# Patient Record
Sex: MALE | Race: WHITE | NOT HISPANIC OR LATINO | Employment: FULL TIME | URBAN - METROPOLITAN AREA
[De-identification: names, ages, dates, MRNs, and addresses within clinical notes are randomized per-mention and may not be internally consistent; named-entity substitution may affect disease eponyms.]

---

## 2024-01-24 ENCOUNTER — OFFICE VISIT (OUTPATIENT)
Dept: FAMILY MEDICINE | Facility: CLINIC | Age: 24
End: 2024-01-24
Payer: COMMERCIAL

## 2024-01-24 VITALS
RESPIRATION RATE: 16 BRPM | HEIGHT: 69 IN | SYSTOLIC BLOOD PRESSURE: 148 MMHG | OXYGEN SATURATION: 98 % | DIASTOLIC BLOOD PRESSURE: 88 MMHG | HEART RATE: 88 BPM | BODY MASS INDEX: 27.27 KG/M2 | WEIGHT: 184.1 LBS | TEMPERATURE: 97.8 F

## 2024-01-24 DIAGNOSIS — J30.81 ALLERGY TO DOGS: ICD-10-CM

## 2024-01-24 DIAGNOSIS — K14.8 TONGUE LESION: ICD-10-CM

## 2024-01-24 DIAGNOSIS — L98.9 SKIN LESION: ICD-10-CM

## 2024-01-24 DIAGNOSIS — Z13.1 SCREENING FOR DIABETES MELLITUS: ICD-10-CM

## 2024-01-24 DIAGNOSIS — Z13.220 SCREENING CHOLESTEROL LEVEL: ICD-10-CM

## 2024-01-24 DIAGNOSIS — R03.0 ELEVATED BLOOD PRESSURE READING WITHOUT DIAGNOSIS OF HYPERTENSION: ICD-10-CM

## 2024-01-24 DIAGNOSIS — Z00.00 ROUTINE GENERAL MEDICAL EXAMINATION AT A HEALTH CARE FACILITY: Primary | ICD-10-CM

## 2024-01-24 PROCEDURE — 99385 PREV VISIT NEW AGE 18-39: CPT | Performed by: FAMILY MEDICINE

## 2024-01-24 ASSESSMENT — ENCOUNTER SYMPTOMS
JOINT SWELLING: 0
EYE PAIN: 0
CONSTIPATION: 0
ABDOMINAL PAIN: 0
DIZZINESS: 0
NERVOUS/ANXIOUS: 0
HEADACHES: 0
CHILLS: 0
FEVER: 0
SHORTNESS OF BREATH: 0
PALPITATIONS: 0
SORE THROAT: 0
DYSURIA: 0
NAUSEA: 0
HEMATOCHEZIA: 0
MYALGIAS: 0
WEAKNESS: 0
PARESTHESIAS: 0
HEMATURIA: 0
DIARRHEA: 0
COUGH: 0
HEARTBURN: 0
FREQUENCY: 0
ARTHRALGIAS: 0

## 2024-01-24 ASSESSMENT — PAIN SCALES - GENERAL: PAINLEVEL: NO PAIN (0)

## 2024-01-24 NOTE — PROGRESS NOTES
Preventive Care Visit  [facility]  Ben Castillo DO, Family Medicine  Jan 24, 2024      SUBJECTIVE:   Juvenal is a 23 year old, presenting for the following:  Physical and Establish Care        1/24/2024     3:52 PM   Additional Questions   Roomed by DAPHNEY Gaxiola   Accompanied by N/A     Healthy Habits:     Getting at least 3 servings of Calcium per day:  NO    Bi-annual eye exam:  NO    Dental care twice a year:  Yes    Sleep apnea or symptoms of sleep apnea:  None    Diet:  Regular (no restrictions)    Frequency of exercise:  2-3 days/week    Duration of exercise:  Greater than 60 minutes    Taking medications regularly:  Yes    Medication side effects:  None    Additional concerns today:  Yes    He has a few concerns he would like to discuss today.  Over the past 2 years he has a dark circular lesion on his tongue that tends to wax and wane.  He has been seen by a couple of different providers in the past for this, including an oral specialist through the [facility] last July and was told that his symptoms appear to be vascular and probably from his retainer rubbing on the tongue.  They offered to monitor the lesion or proceed with an excisional biopsy.  He has elected to monitor it and he reports not using his retainer for the past month and the lesion has improved quite a bit.    He describes having a small skin lesion on the shaft of his penis since he was in high school.  Occasionally it feels a little bit firm.  It is not painful.    He is concerned that he may be allergic to dogs.  This never used to be a problem, but when he came home after being in college he started noticing watery itchy eyes and nasal drainage symptoms when he was around his dog.  He has tried Allegra and is wondering what other treatment options may be helpful.    Social history.  He recently graduated from [employer] and is now working for [employer].  He is originally from  "[place], [place]    Today's PHQ-2 Score:       1/24/2024     3:45 PM   PHQ-2 ( 1999 [employer])   Q1: Little interest or pleasure in doing things 0   Q2: Feeling down, depressed or hopeless 0   PHQ-2 Score 0   Q1: Little interest or pleasure in doing things Not at all   Q2: Feeling down, depressed or hopeless Not at all   PHQ-2 Score 0                       Social History     Tobacco Use    Smoking status: Never    Smokeless tobacco: Never   Substance Use Topics    Alcohol use: Not on file             1/24/2024     3:45 PM   Alcohol Use   Prescreen: >3 drinks/day or >7 drinks/week? No          No data to display                Last PSA: No results found for: \"PSA\"    Reviewed orders with patient. Reviewed health maintenance and updated orders accordingly - Yes  Lab work is in process  Labs reviewed in EPIC    Reviewed and updated as needed this visit by clinical staff   Tobacco  Allergies  Meds              Reviewed and updated as needed this visit by Provider     Meds                Review of Systems   Constitutional:  Negative for chills and fever.   HENT:  Negative for congestion, ear pain, hearing loss and sore throat.    Eyes:  Negative for pain and visual disturbance.   Respiratory:  Negative for cough and shortness of breath.    Cardiovascular:  Negative for chest pain and palpitations.   Gastrointestinal:  Negative for abdominal pain, constipation, diarrhea and nausea.   Genitourinary:  Negative for dysuria, frequency, genital sores, hematuria, penile discharge and urgency.   Musculoskeletal:  Negative for arthralgias, joint swelling and myalgias.   Skin:  Negative for rash.   Neurological:  Negative for dizziness, weakness and headaches.   Psychiatric/Behavioral:  The patient is not nervous/anxious.          OBJECTIVE:   BP (!) 148/88   Pulse 88   Temp 97.8  F (36.6  C) (Temporal)   Resp 16   Ht 1.759 m (5' 9.25\")   Wt 83.5 kg (184 lb 1.6 oz)   SpO2 98%   BMI 26.99 kg/m     Estimated body mass index is " "26.99 kg/m  as calculated from the following:    Height as of this encounter: 1.759 m (5' 9.25\").    Weight as of this encounter: 83.5 kg (184 lb 1.6 oz).  Physical Exam  GENERAL: alert and no distress  EYES: Eyes grossly normal to inspection, PERRL and conjunctivae and sclerae normal  HENT: ear canals and TM's normal, nose and mouth without ulcers or lesions  NECK: no adenopathy, no asymmetry, masses, or scars  RESP: lungs clear to auscultation - no rales, rhonchi or wheezes  CV: regular rate and rhythm, normal S1 S2, no S3 or S4, no murmur, click or rub, no peripheral edema  ABDOMEN: soft, nontender, no hepatosplenomegaly, no masses and bowel sounds normal   (male): normal male genitalia.  There is a small, slightly inflamed papule consistent with a Boy spot on the shaft of his penis.  No urethral discharge.  MS: no gross musculoskeletal defects noted, no edema  SKIN: no suspicious lesions or rashes  NEURO: Normal strength and tone, mentation intact and speech normal  PSYCH: mentation appears normal, affect normal/bright  LYMPH: no cervical, supraclavicular, axillary, or inguinal adenopathy    Diagnostic Test Results:  Labs reviewed in Epic    ASSESSMENT/PLAN:   Routine general medical examination at a health care facility  I encouraged him to get regular exercise and eat a healthy diet.  He is going to schedule lab visit when he is fasting to have the labs below checked.    Tongue lesion  The tongue lesion appears to be vascular in nature.  He is already seen an oral specialist at the [facility] about this about 7 months ago and was given reassurance at that time.  He stopped using his retainer this past month and the lesion has improved significantly.  I recommended he schedule a follow-up appointment with his orthodontist to see if any adjustments need to be made to his retainer that could help with this issue.    Allergy to dogs  I recommended oral antihistamines and Flonase when needed.  He will let me " know if he would like to see an allergy specialist for further evaluation and testing.    Skin lesion  The lesion on his penis seems consistent with a mildly inflamed [place] spot.  Reassurance was given.    Elevated blood pressure reading without a diagnosis of hypertension  His symptoms are probably due to whitecoat hypertension.  He gets quite nervous when having his blood pressure checked.  I recommended he start monitoring his BP at home and let us know if he is getting readings consistently greater than 140/90.  Hypertension does not run in his family.    Screening cholesterol level  - Lipid Profile (Chol, Trig, HDL, LDL calc); Future    Screening for diabetes mellitus  - Comprehensive metabolic panel (BMP + Alb, Alk Phos, ALT, AST, Total. Bili, TP); Future    Patient has been advised of split billing requirements and indicates understanding: Yes      Counseling  Reviewed preventive health counseling, as reflected in patient instructions       Regular exercise       Healthy diet/nutrition        He reports that he has never smoked. He has never used smokeless tobacco.            Signed Electronically by: Ben Castillo DO  Answers submitted by the patient for this visit:  Annual Preventive Visit (Submitted on 1/24/2024)  Chief Complaint: Annual Exam:  Blood in stool: No  heartburn: No  peripheral edema: No  mood changes: No  Skin sensation changes: No  impotence: No